# Patient Record
Sex: FEMALE | Race: BLACK OR AFRICAN AMERICAN | NOT HISPANIC OR LATINO | URBAN - METROPOLITAN AREA
[De-identification: names, ages, dates, MRNs, and addresses within clinical notes are randomized per-mention and may not be internally consistent; named-entity substitution may affect disease eponyms.]

---

## 2022-05-26 ENCOUNTER — OFFICE VISIT (OUTPATIENT)
Dept: URBAN - METROPOLITAN AREA CLINIC 70 | Facility: CLINIC | Age: 52
End: 2022-05-26
Payer: MEDICAID

## 2022-05-26 ENCOUNTER — WEB ENCOUNTER (OUTPATIENT)
Dept: URBAN - METROPOLITAN AREA CLINIC 70 | Facility: CLINIC | Age: 52
End: 2022-05-26

## 2022-05-26 VITALS
DIASTOLIC BLOOD PRESSURE: 75 MMHG | HEART RATE: 61 BPM | BODY MASS INDEX: 28.02 KG/M2 | SYSTOLIC BLOOD PRESSURE: 119 MMHG | TEMPERATURE: 98.1 F | WEIGHT: 139 LBS | HEIGHT: 59 IN

## 2022-05-26 DIAGNOSIS — K59.04 CHRONIC IDIOPATHIC CONSTIPATION: ICD-10-CM

## 2022-05-26 DIAGNOSIS — K21.9 GASTROESOPHAGEAL REFLUX DISEASE, UNSPECIFIED WHETHER ESOPHAGITIS PRESENT: ICD-10-CM

## 2022-05-26 PROCEDURE — 99204 OFFICE O/P NEW MOD 45 MIN: CPT

## 2022-05-26 RX ORDER — MELOXICAM 7.5 MG/1
TABLET ORAL
Qty: 0 | Refills: 0 | Status: ACTIVE | COMMUNITY
Start: 1900-01-01

## 2022-05-26 RX ORDER — VENLAFAXINE HYDROCHLORIDE 75 MG/1
TABLET ORAL
Qty: 0 | Refills: 0 | Status: ACTIVE | COMMUNITY
Start: 1900-01-01

## 2022-05-26 RX ORDER — PANTOPRAZOLE SODIUM 40 MG/1
TAKE 1 TABLET IN THE MORNING ON AN EMPTY STOMACH, WAIT 30 MINUTES, THEN START EATING TABLET, DELAYED RELEASE ORAL ONCE A DAY
Qty: 90 | Refills: 3 | OUTPATIENT
Start: 2022-05-26

## 2022-05-26 RX ORDER — BISOPROLOL FUMARATE AND HYDROCHLOROTHIAZIDE 5; 6.25 MG/1; MG/1
TAKE 1 TABLET BY ORAL ROUTE ONCE DAILY TABLET ORAL 1
Qty: 0 | Refills: 0 | Status: ACTIVE | COMMUNITY
Start: 1900-01-01

## 2022-05-26 RX ORDER — ATORVASTATIN CALCIUM 20 MG/1
TABLET, FILM COATED ORAL
Qty: 0 | Refills: 0 | Status: ACTIVE | COMMUNITY
Start: 1900-01-01

## 2022-05-26 RX ORDER — BENZONATATE 100 MG/1
TAKE 1 CAPSULE BY MOUTH THREE TIMES DAILY AS NEEDED DIAGNOSIS UNAVAILABLE CAPSULE ORAL
Qty: 90 | Refills: 0 | Status: ACTIVE | COMMUNITY

## 2022-05-26 RX ORDER — OXYBUTYNIN CHLORIDE 5 MG/1
TABLET ORAL
Qty: 0 | Refills: 0 | Status: ACTIVE | COMMUNITY
Start: 1900-01-01

## 2022-05-26 RX ORDER — FENOFIBRATE 54 MG/1
TAKE 1 TABLET BY MOUTH EVERY DAY WITH FOOD DIAGNOSIS UNAVAILABLE TABLET ORAL
Qty: 30 | Refills: 0 | Status: ACTIVE | COMMUNITY

## 2022-05-26 NOTE — HPI-TODAY'S VISIT:
Pt presents for GERD and constipation.  She states that she has experienced indigestion, assoicated regurgitation, and occasional nausea "all her life." She has never tried anything for her symptoms.  She is s/o cholecystectomy in 2018.  She denies melena, dysphagia, or abdominal pain.  She admits to constipation with bowel movements occuring every 2-3 days without feelings of complete evacuation.  She states that stools are small and hard. She has not tried anything for her symptoms. She denies rectal bleeding or diarrhea.   Last colonoscopy was in 2019 and was normal.  Recall 10 years.

## 2022-07-07 ENCOUNTER — OFFICE VISIT (OUTPATIENT)
Dept: URBAN - METROPOLITAN AREA CLINIC 70 | Facility: CLINIC | Age: 52
End: 2022-07-07

## 2022-07-07 NOTE — HPI-OTHER HISTORIES
OV 5/26/2022: Pt presents for GERD and constipation.  She states that she has experienced indigestion, assoicated regurgitation, and occasional nausea "all her life." She has never tried anything for her symptoms.  She is s/o cholecystectomy in 2018.  She denies melena, dysphagia, or abdominal pain.  She admits to constipation with bowel movements occuring every 2-3 days without feelings of complete evacuation.  She states that stools are small and hard. She has not tried anything for her symptoms. She denies rectal bleeding or diarrhea.   Last colonoscopy was in 2019 and was normal.  Recall 10 years.

## 2022-08-09 ENCOUNTER — OFFICE VISIT (OUTPATIENT)
Dept: URBAN - METROPOLITAN AREA CLINIC 70 | Facility: CLINIC | Age: 52
End: 2022-08-09
Payer: MEDICAID

## 2022-08-09 VITALS
HEART RATE: 63 BPM | DIASTOLIC BLOOD PRESSURE: 73 MMHG | SYSTOLIC BLOOD PRESSURE: 117 MMHG | BODY MASS INDEX: 27.01 KG/M2 | TEMPERATURE: 97.5 F | HEIGHT: 59 IN | WEIGHT: 134 LBS

## 2022-08-09 DIAGNOSIS — K21.9 GASTROESOPHAGEAL REFLUX DISEASE, UNSPECIFIED WHETHER ESOPHAGITIS PRESENT: ICD-10-CM

## 2022-08-09 DIAGNOSIS — K59.04 CHRONIC IDIOPATHIC CONSTIPATION: ICD-10-CM

## 2022-08-09 PROBLEM — 82934008: Status: ACTIVE | Noted: 2022-05-26

## 2022-08-09 PROCEDURE — 99214 OFFICE O/P EST MOD 30 MIN: CPT

## 2022-08-09 RX ORDER — BENZONATATE 100 MG/1
TAKE 1 CAPSULE BY MOUTH THREE TIMES DAILY AS NEEDED DIAGNOSIS UNAVAILABLE CAPSULE ORAL
Qty: 90 | Refills: 0 | Status: ACTIVE | COMMUNITY

## 2022-08-09 RX ORDER — FENOFIBRATE 54 MG/1
TAKE 1 TABLET BY MOUTH EVERY DAY WITH FOOD DIAGNOSIS UNAVAILABLE TABLET ORAL
Qty: 30 | Refills: 0 | Status: ACTIVE | COMMUNITY

## 2022-08-09 RX ORDER — BISOPROLOL FUMARATE AND HYDROCHLOROTHIAZIDE 5; 6.25 MG/1; MG/1
TAKE 1 TABLET BY ORAL ROUTE ONCE DAILY TABLET ORAL 1
Qty: 0 | Refills: 0 | Status: ACTIVE | COMMUNITY
Start: 1900-01-01

## 2022-08-09 RX ORDER — OXYBUTYNIN CHLORIDE 5 MG/1
TABLET ORAL
Qty: 0 | Refills: 0 | Status: ACTIVE | COMMUNITY
Start: 1900-01-01

## 2022-08-09 RX ORDER — ATORVASTATIN CALCIUM 20 MG/1
TABLET, FILM COATED ORAL
Qty: 0 | Refills: 0 | Status: ACTIVE | COMMUNITY
Start: 1900-01-01

## 2022-08-09 RX ORDER — PANTOPRAZOLE SODIUM 40 MG/1
TAKE 1 TABLET IN THE MORNING ON AN EMPTY STOMACH, WAIT 30 MINUTES, THEN START EATING TABLET, DELAYED RELEASE ORAL ONCE A DAY
Qty: 90 | Refills: 3 | Status: ACTIVE | COMMUNITY
Start: 2022-05-26

## 2022-08-09 RX ORDER — PANTOPRAZOLE SODIUM 40 MG/1
TAKE 1 TABLET IN THE MORNING ON AN EMPTY STOMACH, WAIT 30 MINUTES, THEN START EATING TABLET, DELAYED RELEASE ORAL ONCE A DAY
OUTPATIENT
Start: 2022-05-26

## 2022-08-09 RX ORDER — VENLAFAXINE HYDROCHLORIDE 75 MG/1
TABLET ORAL
Qty: 0 | Refills: 0 | Status: ACTIVE | COMMUNITY
Start: 1900-01-01

## 2022-08-09 RX ORDER — MELOXICAM 7.5 MG/1
TABLET ORAL
Qty: 0 | Refills: 0 | Status: ACTIVE | COMMUNITY
Start: 1900-01-01

## 2022-08-09 NOTE — HPI-TODAY'S VISIT:
Pt presents for a f/u for GERD and constipation. Today she states regurgitation and indigestion has improved with Pantoprazole 40mg.  She denies dysphagia, nausea, vomiting, or melena.  She admits to continued constipation.  She has been taking Miralax daily, but states it takes 3-4 days to have a bowel movements. She denies rectal bleeding, weight loss, or diarrhea.

## 2022-09-20 ENCOUNTER — OFFICE VISIT (OUTPATIENT)
Dept: URBAN - METROPOLITAN AREA CLINIC 70 | Facility: CLINIC | Age: 52
End: 2022-09-20
Payer: MEDICAID

## 2022-09-20 VITALS
WEIGHT: 136.84 LBS | HEIGHT: 59 IN | SYSTOLIC BLOOD PRESSURE: 110 MMHG | HEART RATE: 62 BPM | BODY MASS INDEX: 27.59 KG/M2 | DIASTOLIC BLOOD PRESSURE: 70 MMHG | TEMPERATURE: 97.2 F

## 2022-09-20 DIAGNOSIS — K58.1 IRRITABLE BOWEL SYNDROME WITH CONSTIPATION: ICD-10-CM

## 2022-09-20 DIAGNOSIS — K21.9 GASTROESOPHAGEAL REFLUX DISEASE, UNSPECIFIED WHETHER ESOPHAGITIS PRESENT: ICD-10-CM

## 2022-09-20 PROCEDURE — 99214 OFFICE O/P EST MOD 30 MIN: CPT

## 2022-09-20 RX ORDER — PANTOPRAZOLE SODIUM 40 MG/1
TAKE 1 TABLET IN THE MORNING ON AN EMPTY STOMACH, WAIT 30 MINUTES, THEN START EATING TABLET, DELAYED RELEASE ORAL ONCE A DAY
Status: ON HOLD | COMMUNITY
Start: 2022-05-26

## 2022-09-20 RX ORDER — VENLAFAXINE HYDROCHLORIDE 75 MG/1
TABLET ORAL
Qty: 0 | Refills: 0 | Status: ACTIVE | COMMUNITY
Start: 1900-01-01

## 2022-09-20 RX ORDER — OXYBUTYNIN CHLORIDE 5 MG/1
TABLET ORAL
Qty: 0 | Refills: 0 | Status: ACTIVE | COMMUNITY
Start: 1900-01-01

## 2022-09-20 RX ORDER — PANTOPRAZOLE SODIUM 40 MG/1
TAKE 1 TABLET IN THE MORNING ON AN EMPTY STOMACH, WAIT 30 MINUTES, THEN START EATING TABLET, DELAYED RELEASE ORAL ONCE A DAY
Qty: 90 | Refills: 3
Start: 2022-05-26

## 2022-09-20 RX ORDER — MELOXICAM 7.5 MG/1
TABLET ORAL
Qty: 0 | Refills: 0 | Status: ACTIVE | COMMUNITY
Start: 1900-01-01

## 2022-09-20 RX ORDER — BISOPROLOL FUMARATE AND HYDROCHLOROTHIAZIDE 5; 6.25 MG/1; MG/1
TAKE 1 TABLET BY ORAL ROUTE ONCE DAILY TABLET ORAL 1
Qty: 0 | Refills: 0 | Status: ACTIVE | COMMUNITY
Start: 1900-01-01

## 2022-09-20 RX ORDER — BENZONATATE 100 MG/1
TAKE 1 CAPSULE BY MOUTH THREE TIMES DAILY AS NEEDED DIAGNOSIS UNAVAILABLE CAPSULE ORAL
Qty: 90 | Refills: 0 | Status: ACTIVE | COMMUNITY

## 2022-09-20 RX ORDER — ATORVASTATIN CALCIUM 20 MG/1
TABLET, FILM COATED ORAL
Qty: 0 | Refills: 0 | Status: ACTIVE | COMMUNITY
Start: 1900-01-01

## 2022-09-20 RX ORDER — FENOFIBRATE 54 MG/1
TAKE 1 TABLET BY MOUTH EVERY DAY WITH FOOD DIAGNOSIS UNAVAILABLE TABLET ORAL
Qty: 30 | Refills: 0 | Status: ACTIVE | COMMUNITY

## 2022-09-20 RX ORDER — LINACLOTIDE 290 UG/1
1 CAPSULE AT LEAST 30 MINUTES BEFORE THE FIRST MEAL OF THE DAY ON AN EMPTY STOMACH CAPSULE, GELATIN COATED ORAL ONCE A DAY
Qty: 90 | Refills: 3 | OUTPATIENT
Start: 2022-09-20 | End: 2023-09-15

## 2022-09-20 NOTE — HPI-TODAY'S VISIT:
The pt presents for a f/u for GERD and constipation.  She states that she ran out of her rx for Pantoprazole 40mg a month ago. Since then GERD symptoms have returned.  She states that medication use previously controlling symptoms well.  She states samples for Linzess 145mcg provided at her LOV mildly improved constipation.  She denies weight loss, nausea, vomiting, rectal bleeding, or melena.

## 2022-09-20 NOTE — HPI-OTHER HISTORIES
OV 8/9/2022: Pt presents for a f/u for GERD and constipation. Today she states regurgitation and indigestion has improved with Pantoprazole 40mg.  She denies dysphagia, nausea, vomiting, or melena.  She admits to continued constipation.  She has been taking Miralax daily, but states it takes 3-4 days to have a bowel movements. She denies rectal bleeding, weight loss, or diarrhea. ------------------------ OV 5/26/2022: Pt presents for GERD and constipation.  She states that she has experienced indigestion, assoicated regurgitation, and occasional nausea "all her life." She has never tried anything for her symptoms.  She is s/o cholecystectomy in 2018.  She denies melena, dysphagia, or abdominal pain.  She admits to constipation with bowel movements occuring every 2-3 days without feelings of complete evacuation.  She states that stools are small and hard. She has not tried anything for her symptoms. She denies rectal bleeding or diarrhea.   Last colonoscopy was in 2019 and was normal.  Recall 10 years.

## 2022-11-15 ENCOUNTER — OFFICE VISIT (OUTPATIENT)
Dept: URBAN - METROPOLITAN AREA CLINIC 70 | Facility: CLINIC | Age: 52
End: 2022-11-15
Payer: MEDICAID

## 2022-11-15 VITALS
BODY MASS INDEX: 27.58 KG/M2 | TEMPERATURE: 98.2 F | SYSTOLIC BLOOD PRESSURE: 113 MMHG | HEIGHT: 59 IN | HEART RATE: 67 BPM | DIASTOLIC BLOOD PRESSURE: 75 MMHG | WEIGHT: 136.8 LBS

## 2022-11-15 DIAGNOSIS — K21.9 GASTROESOPHAGEAL REFLUX DISEASE, UNSPECIFIED WHETHER ESOPHAGITIS PRESENT: ICD-10-CM

## 2022-11-15 DIAGNOSIS — K58.1 IRRITABLE BOWEL SYNDROME WITH CONSTIPATION: ICD-10-CM

## 2022-11-15 PROCEDURE — 99214 OFFICE O/P EST MOD 30 MIN: CPT

## 2022-11-15 RX ORDER — POLYETHYLENE GLYCOL 3350, SODIUM SULFATE ANHYDROUS, SODIUM BICARBONATE, SODIUM CHLORIDE, POTASSIUM CHLORIDE 236; 22.74; 6.74; 5.86; 2.97 G/4L; G/4L; G/4L; G/4L; G/4L
AS DIRECTED POWDER, FOR SOLUTION ORAL ONCE A DAY
Qty: 236 GM | Refills: 0 | OUTPATIENT
Start: 2022-11-15 | End: 2022-11-16

## 2022-11-15 RX ORDER — MELOXICAM 7.5 MG/1
TABLET ORAL
Qty: 0 | Refills: 0 | Status: ACTIVE | COMMUNITY
Start: 1900-01-01

## 2022-11-15 RX ORDER — LINACLOTIDE 290 UG/1
1 CAPSULE AT LEAST 30 MINUTES BEFORE THE FIRST MEAL OF THE DAY ON AN EMPTY STOMACH CAPSULE, GELATIN COATED ORAL ONCE A DAY
Qty: 90 | Refills: 3
Start: 2022-09-20 | End: 2023-11-10

## 2022-11-15 RX ORDER — PANTOPRAZOLE SODIUM 40 MG/1
TAKE 1 TABLET IN THE MORNING ON AN EMPTY STOMACH, WAIT 30 MINUTES, THEN START EATING TABLET, DELAYED RELEASE ORAL ONCE A DAY
OUTPATIENT
Start: 2022-05-26

## 2022-11-15 RX ORDER — ATORVASTATIN CALCIUM 20 MG/1
TABLET, FILM COATED ORAL
Qty: 0 | Refills: 0 | Status: ACTIVE | COMMUNITY
Start: 1900-01-01

## 2022-11-15 RX ORDER — OXYBUTYNIN CHLORIDE 5 MG/1
TABLET ORAL
Qty: 0 | Refills: 0 | Status: ACTIVE | COMMUNITY
Start: 1900-01-01

## 2022-11-15 RX ORDER — PANTOPRAZOLE SODIUM 40 MG/1
TAKE 1 TABLET IN THE MORNING ON AN EMPTY STOMACH, WAIT 30 MINUTES, THEN START EATING TABLET, DELAYED RELEASE ORAL ONCE A DAY
Qty: 90 | Refills: 3 | Status: ACTIVE | COMMUNITY
Start: 2022-05-26

## 2022-11-15 RX ORDER — FENOFIBRATE 54 MG/1
TAKE 1 TABLET BY MOUTH EVERY DAY WITH FOOD DIAGNOSIS UNAVAILABLE TABLET ORAL
Qty: 30 | Refills: 0 | Status: ACTIVE | COMMUNITY

## 2022-11-15 RX ORDER — LINACLOTIDE 290 UG/1
1 CAPSULE AT LEAST 30 MINUTES BEFORE THE FIRST MEAL OF THE DAY ON AN EMPTY STOMACH CAPSULE, GELATIN COATED ORAL ONCE A DAY
Qty: 90 | Refills: 3 | Status: ACTIVE | COMMUNITY
Start: 2022-09-20 | End: 2023-09-15

## 2022-11-15 RX ORDER — BENZONATATE 100 MG/1
TAKE 1 CAPSULE BY MOUTH THREE TIMES DAILY AS NEEDED DIAGNOSIS UNAVAILABLE CAPSULE ORAL
Qty: 90 | Refills: 0 | Status: ACTIVE | COMMUNITY

## 2022-11-15 RX ORDER — VENLAFAXINE HYDROCHLORIDE 75 MG/1
TABLET ORAL
Qty: 0 | Refills: 0 | Status: ACTIVE | COMMUNITY
Start: 1900-01-01

## 2022-11-15 RX ORDER — BISOPROLOL FUMARATE AND HYDROCHLOROTHIAZIDE 5; 6.25 MG/1; MG/1
TAKE 1 TABLET BY ORAL ROUTE ONCE DAILY TABLET ORAL 1
Qty: 0 | Refills: 0 | Status: ACTIVE | COMMUNITY
Start: 1900-01-01

## 2022-11-15 NOTE — HPI-OTHER HISTORIES
OV 9/20/2022: The pt presents for a f/u for GERD and constipation.  She states that she ran out of her rx for Pantoprazole 40mg a month ago. Since then GERD symptoms have returned.  She states that medication use previously controlling symptoms well.  She states samples for Linzess 145mcg provided at her LOV mildly improved constipation.  She denies weight loss, nausea, vomiting, rectal bleeding, or melena. -------------------------- OV 8/9/2022: Pt presents for a f/u for GERD and constipation. Today she states regurgitation and indigestion has improved with Pantoprazole 40mg.  She denies dysphagia, nausea, vomiting, or melena.  She admits to continued constipation.  She has been taking Miralax daily, but states it takes 3-4 days to have a bowel movements. She denies rectal bleeding, weight loss, or diarrhea. ------------------------ OV 5/26/2022: Pt presents for GERD and constipation.  She states that she has experienced indigestion, assoicated regurgitation, and occasional nausea "all her life." She has never tried anything for her symptoms.  She is s/o cholecystectomy in 2018.  She denies melena, dysphagia, or abdominal pain.  She admits to constipation with bowel movements occuring every 2-3 days without feelings of complete evacuation.  She states that stools are small and hard. She has not tried anything for her symptoms. She denies rectal bleeding or diarrhea.   Last colonoscopy was in 2019 and was normal.  Recall 10 years.

## 2022-11-15 NOTE — HPI-TODAY'S VISIT:
The pt presents for a f/u for GERD and constipation.  She states that GERD symptoms are well controlled with Pantoprazole 40mg.  She states that following her LOV, she did not have proper insurance information to provide to [facility] pharmacy when they called regarding her rx fo Linzess 290mcg.  Therefore she states she never started Linzess.  She is requesting the rx be resent to the pharmacy.  No other GI symptoms.

## 2022-11-22 ENCOUNTER — OFFICE VISIT (OUTPATIENT)
Dept: URBAN - METROPOLITAN AREA CLINIC 70 | Facility: CLINIC | Age: 52
End: 2022-11-22

## 2022-11-29 ENCOUNTER — TELEPHONE ENCOUNTER (OUTPATIENT)
Dept: URBAN - METROPOLITAN AREA CLINIC 70 | Facility: CLINIC | Age: 52
End: 2022-11-29

## 2022-12-08 ENCOUNTER — P2P PATIENT RECORD (OUTPATIENT)
Age: 52
End: 2022-12-08

## 2022-12-09 ENCOUNTER — OFFICE VISIT (OUTPATIENT)
Dept: URBAN - METROPOLITAN AREA CLINIC 70 | Facility: CLINIC | Age: 52
End: 2022-12-09
Payer: MEDICAID

## 2022-12-09 ENCOUNTER — LAB OUTSIDE AN ENCOUNTER (OUTPATIENT)
Dept: URBAN - METROPOLITAN AREA CLINIC 70 | Facility: CLINIC | Age: 52
End: 2022-12-09

## 2022-12-09 VITALS
HEART RATE: 73 BPM | SYSTOLIC BLOOD PRESSURE: 121 MMHG | WEIGHT: 135.3 LBS | BODY MASS INDEX: 27.28 KG/M2 | HEIGHT: 59 IN | DIASTOLIC BLOOD PRESSURE: 79 MMHG | TEMPERATURE: 97.6 F

## 2022-12-09 DIAGNOSIS — K21.9 GASTROESOPHAGEAL REFLUX DISEASE, UNSPECIFIED WHETHER ESOPHAGITIS PRESENT: ICD-10-CM

## 2022-12-09 DIAGNOSIS — R11.2 NAUSEA AND VOMITING, UNSPECIFIED VOMITING TYPE: ICD-10-CM

## 2022-12-09 DIAGNOSIS — K58.1 IRRITABLE BOWEL SYNDROME WITH CONSTIPATION: ICD-10-CM

## 2022-12-09 PROCEDURE — 99214 OFFICE O/P EST MOD 30 MIN: CPT

## 2022-12-09 RX ORDER — PANTOPRAZOLE SODIUM 40 MG/1
TAKE 1 TABLET IN THE MORNING ON AN EMPTY STOMACH, WAIT 30 MINUTES, THEN START EATING TABLET, DELAYED RELEASE ORAL ONCE A DAY
OUTPATIENT
Start: 2022-05-26

## 2022-12-09 RX ORDER — OXYBUTYNIN CHLORIDE 5 MG/1
TABLET ORAL
Qty: 0 | Refills: 0 | Status: ACTIVE | COMMUNITY
Start: 1900-01-01

## 2022-12-09 RX ORDER — BENZONATATE 100 MG/1
TAKE 1 CAPSULE BY MOUTH THREE TIMES DAILY AS NEEDED DIAGNOSIS UNAVAILABLE CAPSULE ORAL
Qty: 90 | Refills: 0 | Status: ACTIVE | COMMUNITY

## 2022-12-09 RX ORDER — MELOXICAM 7.5 MG/1
TABLET ORAL
Qty: 0 | Refills: 0 | Status: ACTIVE | COMMUNITY
Start: 1900-01-01

## 2022-12-09 RX ORDER — ATORVASTATIN CALCIUM 20 MG/1
TABLET, FILM COATED ORAL
Qty: 0 | Refills: 0 | Status: ACTIVE | COMMUNITY
Start: 1900-01-01

## 2022-12-09 RX ORDER — LINACLOTIDE 290 UG/1
1 CAPSULE AT LEAST 30 MINUTES BEFORE THE FIRST MEAL OF THE DAY ON AN EMPTY STOMACH CAPSULE, GELATIN COATED ORAL ONCE A DAY
OUTPATIENT
Start: 2022-09-20

## 2022-12-09 RX ORDER — FENOFIBRATE 54 MG/1
TAKE 1 TABLET BY MOUTH EVERY DAY WITH FOOD DIAGNOSIS UNAVAILABLE TABLET ORAL
Qty: 30 | Refills: 0 | Status: ACTIVE | COMMUNITY

## 2022-12-09 RX ORDER — PANTOPRAZOLE SODIUM 40 MG/1
TAKE 1 TABLET IN THE MORNING ON AN EMPTY STOMACH, WAIT 30 MINUTES, THEN START EATING TABLET, DELAYED RELEASE ORAL ONCE A DAY
Status: ACTIVE | COMMUNITY
Start: 2022-05-26

## 2022-12-09 RX ORDER — BISOPROLOL FUMARATE AND HYDROCHLOROTHIAZIDE 5; 6.25 MG/1; MG/1
TAKE 1 TABLET BY ORAL ROUTE ONCE DAILY TABLET ORAL 1
Qty: 0 | Refills: 0 | Status: ACTIVE | COMMUNITY
Start: 1900-01-01

## 2022-12-09 RX ORDER — VENLAFAXINE HYDROCHLORIDE 75 MG/1
TABLET ORAL
Qty: 0 | Refills: 0 | Status: ACTIVE | COMMUNITY
Start: 1900-01-01

## 2022-12-09 RX ORDER — LINACLOTIDE 290 UG/1
1 CAPSULE AT LEAST 30 MINUTES BEFORE THE FIRST MEAL OF THE DAY ON AN EMPTY STOMACH CAPSULE, GELATIN COATED ORAL ONCE A DAY
Qty: 90 | Refills: 3 | Status: ACTIVE | COMMUNITY
Start: 2022-09-20 | End: 2023-11-10

## 2022-12-09 NOTE — HPI-OTHER HISTORIES
OV 11/15/2022: The pt presents for a f/u for GERD and constipation.  She states that GERD symptoms are well controlled with Pantoprazole 40mg.  She states that following her LOV, she did not have proper insurance information to provide to [facility] pharmacy when they called regarding her rx fo Linzess 290mcg.  Therefore she states she never started Linzess.  She is requesting the rx be resent to the pharmacy.  No other GI symptoms. ------------------------------------------------- OV 9/20/2022: The pt presents for a f/u for GERD and constipation.  She states that she ran out of her rx for Pantoprazole 40mg a month ago. Since then GERD symptoms have returned.  She states that medication use previously controlling symptoms well.  She states samples for Linzess 145mcg provided at her LOV mildly improved constipation.  She denies weight loss, nausea, vomiting, rectal bleeding, or melena. -------------------------- OV 8/9/2022: Pt presents for a f/u for GERD and constipation. Today she states regurgitation and indigestion has improved with Pantoprazole 40mg.  She denies dysphagia, nausea, vomiting, or melena.  She admits to continued constipation.  She has been taking Miralax daily, but states it takes 3-4 days to have a bowel movements. She denies rectal bleeding, weight loss, or diarrhea. ------------------------ OV 5/26/2022: Pt presents for GERD and constipation.  She states that she has experienced indigestion, assoicated regurgitation, and occasional nausea "all her life." She has never tried anything for her symptoms.  She is s/o cholecystectomy in 2018.  She denies melena, dysphagia, or abdominal pain.  She admits to constipation with bowel movements occuring every 2-3 days without feelings of complete evacuation.  She states that stools are small and hard. She has not tried anything for her symptoms. She denies rectal bleeding or diarrhea.   Last colonoscopy was in 2019 and was normal.  Recall 10 years.

## 2022-12-09 NOTE — HPI-TODAY'S VISIT:
The pt was seen in the ED 11/26/2022 for an episode of vomiting. Today she states vomiting has improved since her ED visit, but nausea is still occuring daily. Symptoms were previously well controlled with Pantoprazole 40mg daily at her LOV.  No prior EGD. S/P CCY. Linzess 290mcg was just approved by her insurance today (the pt received a call during her office visit).  She is requesting more samples in the meantime.  She states taking Linzess 290mcg manages her constipation well.

## 2022-12-12 PROBLEM — 235595009: Status: ACTIVE | Noted: 2022-05-26

## 2022-12-30 ENCOUNTER — TELEPHONE ENCOUNTER (OUTPATIENT)
Dept: URBAN - METROPOLITAN AREA CLINIC 92 | Facility: CLINIC | Age: 52
End: 2022-12-30

## 2022-12-30 ENCOUNTER — CLAIMS CREATED FROM THE CLAIM WINDOW (OUTPATIENT)
Dept: URBAN - METROPOLITAN AREA SURGERY CENTER 24 | Facility: SURGERY CENTER | Age: 52
End: 2022-12-30

## 2022-12-30 ENCOUNTER — CLAIMS CREATED FROM THE CLAIM WINDOW (OUTPATIENT)
Dept: URBAN - METROPOLITAN AREA SURGERY CENTER 24 | Facility: SURGERY CENTER | Age: 52
End: 2022-12-30
Payer: MEDICAID

## 2022-12-30 ENCOUNTER — CLAIMS CREATED FROM THE CLAIM WINDOW (OUTPATIENT)
Dept: URBAN - METROPOLITAN AREA CLINIC 4 | Facility: CLINIC | Age: 52
End: 2022-12-30
Payer: MEDICAID

## 2022-12-30 DIAGNOSIS — K20.90 ESOPHAGITIS: ICD-10-CM

## 2022-12-30 DIAGNOSIS — K31.89 OTHER DISEASES OF STOMACH AND DUODENUM: ICD-10-CM

## 2022-12-30 DIAGNOSIS — K21.9 ACID REFLUX: ICD-10-CM

## 2022-12-30 DIAGNOSIS — R11.2 ACUTE NAUSEA WITH NONBILIOUS VOMITING: ICD-10-CM

## 2022-12-30 PROCEDURE — 88305 TISSUE EXAM BY PATHOLOGIST: CPT | Performed by: PATHOLOGY

## 2022-12-30 PROCEDURE — G8907 PT DOC NO EVENTS ON DISCHARG: HCPCS | Performed by: INTERNAL MEDICINE

## 2022-12-30 PROCEDURE — 43239 EGD BIOPSY SINGLE/MULTIPLE: CPT | Performed by: INTERNAL MEDICINE

## 2022-12-30 RX ORDER — PANTOPRAZOLE SODIUM 40 MG/1
TAKE 1 TABLET IN THE MORNING ON AN EMPTY STOMACH, WAIT 30 MINUTES, THEN START EATING TABLET, DELAYED RELEASE ORAL ONCE A DAY
Status: ACTIVE | COMMUNITY
Start: 2022-05-26

## 2022-12-30 RX ORDER — PANTOPRAZOLE SODIUM 40 MG/1
TAKE 1 TABLET IN THE MORNING ON AN EMPTY STOMACH, WAIT 30 MINUTES, THEN START EATING TABLET, DELAYED RELEASE ORAL ONCE A DAY
Qty: 90 | Refills: 0

## 2022-12-30 RX ORDER — BENZONATATE 100 MG/1
TAKE 1 CAPSULE BY MOUTH THREE TIMES DAILY AS NEEDED DIAGNOSIS UNAVAILABLE CAPSULE ORAL
Qty: 90 | Refills: 0 | Status: ACTIVE | COMMUNITY

## 2022-12-30 RX ORDER — FENOFIBRATE 54 MG/1
TAKE 1 TABLET BY MOUTH EVERY DAY WITH FOOD DIAGNOSIS UNAVAILABLE TABLET ORAL
Qty: 30 | Refills: 0 | Status: ACTIVE | COMMUNITY

## 2022-12-30 RX ORDER — MELOXICAM 7.5 MG/1
TABLET ORAL
Qty: 0 | Refills: 0 | Status: ACTIVE | COMMUNITY
Start: 1900-01-01

## 2022-12-30 RX ORDER — ATORVASTATIN CALCIUM 20 MG/1
TABLET, FILM COATED ORAL
Qty: 0 | Refills: 0 | Status: ACTIVE | COMMUNITY
Start: 1900-01-01

## 2022-12-30 RX ORDER — OXYBUTYNIN CHLORIDE 5 MG/1
TABLET ORAL
Qty: 0 | Refills: 0 | Status: ACTIVE | COMMUNITY
Start: 1900-01-01

## 2022-12-30 RX ORDER — BISOPROLOL FUMARATE AND HYDROCHLOROTHIAZIDE 5; 6.25 MG/1; MG/1
TAKE 1 TABLET BY ORAL ROUTE ONCE DAILY TABLET ORAL 1
Qty: 0 | Refills: 0 | Status: ACTIVE | COMMUNITY
Start: 1900-01-01

## 2022-12-30 RX ORDER — LINACLOTIDE 290 UG/1
1 CAPSULE AT LEAST 30 MINUTES BEFORE THE FIRST MEAL OF THE DAY ON AN EMPTY STOMACH CAPSULE, GELATIN COATED ORAL ONCE A DAY
Status: ACTIVE | COMMUNITY
Start: 2022-09-20

## 2022-12-30 RX ORDER — VENLAFAXINE HYDROCHLORIDE 75 MG/1
TABLET ORAL
Qty: 0 | Refills: 0 | Status: ACTIVE | COMMUNITY
Start: 1900-01-01

## 2023-02-15 ENCOUNTER — DASHBOARD ENCOUNTERS (OUTPATIENT)
Age: 53
End: 2023-02-15

## 2023-02-15 ENCOUNTER — OFFICE VISIT (OUTPATIENT)
Dept: URBAN - METROPOLITAN AREA CLINIC 70 | Facility: CLINIC | Age: 53
End: 2023-02-15
Payer: MEDICAID

## 2023-02-15 VITALS
TEMPERATURE: 96.8 F | SYSTOLIC BLOOD PRESSURE: 131 MMHG | WEIGHT: 138 LBS | DIASTOLIC BLOOD PRESSURE: 81 MMHG | HEIGHT: 59 IN | HEART RATE: 65 BPM | BODY MASS INDEX: 27.82 KG/M2

## 2023-02-15 DIAGNOSIS — K21.00 GASTROESOPHAGEAL REFLUX DISEASE WITH ESOPHAGITIS WITHOUT HEMORRHAGE: ICD-10-CM

## 2023-02-15 DIAGNOSIS — K58.1 IRRITABLE BOWEL SYNDROME WITH CONSTIPATION: ICD-10-CM

## 2023-02-15 DIAGNOSIS — R11.0 NAUSEA: ICD-10-CM

## 2023-02-15 PROBLEM — 266433003: Status: ACTIVE | Noted: 2023-02-15

## 2023-02-15 PROBLEM — 440630006: Status: ACTIVE | Noted: 2022-09-20

## 2023-02-15 PROCEDURE — 99214 OFFICE O/P EST MOD 30 MIN: CPT

## 2023-02-15 RX ORDER — PANTOPRAZOLE SODIUM 40 MG/1
TAKE 1 TABLET IN THE MORNING ON AN EMPTY STOMACH, WAIT 30 MINUTES, THEN START EATING TABLET, DELAYED RELEASE ORAL ONCE A DAY
OUTPATIENT
Start: 2022-05-26

## 2023-02-15 RX ORDER — BISOPROLOL FUMARATE AND HYDROCHLOROTHIAZIDE 5; 6.25 MG/1; MG/1
TAKE 1 TABLET BY ORAL ROUTE ONCE DAILY TABLET ORAL 1
Qty: 0 | Refills: 0 | Status: ACTIVE | COMMUNITY
Start: 1900-01-01

## 2023-02-15 RX ORDER — FENOFIBRATE 54 MG/1
TAKE 1 TABLET BY MOUTH EVERY DAY WITH FOOD DIAGNOSIS UNAVAILABLE TABLET ORAL
Qty: 30 | Refills: 0 | Status: ACTIVE | COMMUNITY

## 2023-02-15 RX ORDER — VENLAFAXINE HYDROCHLORIDE 75 MG/1
TABLET ORAL
Qty: 0 | Refills: 0 | Status: ACTIVE | COMMUNITY
Start: 1900-01-01

## 2023-02-15 RX ORDER — ATORVASTATIN CALCIUM 20 MG/1
TABLET, FILM COATED ORAL
Qty: 0 | Refills: 0 | Status: ACTIVE | COMMUNITY
Start: 1900-01-01

## 2023-02-15 RX ORDER — LINACLOTIDE 290 UG/1
1 CAPSULE AT LEAST 30 MINUTES BEFORE THE FIRST MEAL OF THE DAY ON AN EMPTY STOMACH CAPSULE, GELATIN COATED ORAL ONCE A DAY
OUTPATIENT
Start: 2022-09-20

## 2023-02-15 RX ORDER — OXYBUTYNIN CHLORIDE 5 MG/1
TABLET ORAL
Qty: 0 | Refills: 0 | Status: ACTIVE | COMMUNITY
Start: 1900-01-01

## 2023-02-15 RX ORDER — LINACLOTIDE 290 UG/1
1 CAPSULE AT LEAST 30 MINUTES BEFORE THE FIRST MEAL OF THE DAY ON AN EMPTY STOMACH CAPSULE, GELATIN COATED ORAL ONCE A DAY
Status: ACTIVE | COMMUNITY
Start: 2022-09-20

## 2023-02-15 RX ORDER — BENZONATATE 100 MG/1
TAKE 1 CAPSULE BY MOUTH THREE TIMES DAILY AS NEEDED DIAGNOSIS UNAVAILABLE CAPSULE ORAL
Qty: 90 | Refills: 0 | Status: ACTIVE | COMMUNITY

## 2023-02-15 RX ORDER — PROMETHAZINE HYDROCHLORIDE 25 MG/1
1 SUPPOSITORY AS NEEDED SUPPOSITORY RECTAL
OUTPATIENT

## 2023-02-15 RX ORDER — PROMETHAZINE HYDROCHLORIDE 25 MG/1
1 SUPPOSITORY AS NEEDED SUPPOSITORY RECTAL
Status: ACTIVE | COMMUNITY

## 2023-02-15 RX ORDER — PANTOPRAZOLE SODIUM 40 MG/1
TAKE 1 TABLET IN THE MORNING ON AN EMPTY STOMACH, WAIT 30 MINUTES, THEN START EATING TABLET, DELAYED RELEASE ORAL ONCE A DAY
Status: ACTIVE | COMMUNITY
Start: 2022-05-26

## 2023-02-15 RX ORDER — MELOXICAM 7.5 MG/1
TABLET ORAL
Qty: 0 | Refills: 0 | Status: ON HOLD | COMMUNITY
Start: 1900-01-01

## 2023-02-15 NOTE — HPI-OTHER HISTORIES
OV 12/9/2022: The pt was seen in the ED 11/26/2022 for an episode of vomiting. Today she states vomiting has improved since her ED visit, but nausea is still occuring daily. Symptoms were previously well controlled with Pantoprazole 40mg daily at her LOV.  No prior EGD. S/P CCY. Linzess 290mcg was just approved by her insurance today (the pt received a call during her office visit).  She is requesting more samples in the meantime.  She states taking Linzess 290mcg manages her constipation well. --------------------------------------------------------- OV 11/15/2022: The pt presents for a f/u for GERD and constipation.  She states that GERD symptoms are well controlled with Pantoprazole 40mg.  She states that following her LOV, she did not have proper insurance information to provide to [facility] pharmacy when they called regarding her rx fo Linzess 290mcg.  Therefore she states she never started Linzess.  She is requesting the rx be resent to the pharmacy.  No other GI symptoms. ------------------------------------------------- OV 9/20/2022: The pt presents for a f/u for GERD and constipation.  She states that she ran out of her rx for Pantoprazole 40mg a month ago. Since then GERD symptoms have returned.  She states that medication use previously controlling symptoms well.  She states samples for Linzess 145mcg provided at her LOV mildly improved constipation.  She denies weight loss, nausea, vomiting, rectal bleeding, or melena. -------------------------- OV 8/9/2022: Pt presents for a f/u for GERD and constipation. Today she states regurgitation and indigestion has improved with Pantoprazole 40mg.  She denies dysphagia, nausea, vomiting, or melena.  She admits to continued constipation.  She has been taking Miralax daily, but states it takes 3-4 days to have a bowel movements. She denies rectal bleeding, weight loss, or diarrhea. ------------------------ OV 5/26/2022: Pt presents for GERD and constipation.  She states that she has experienced indigestion, assoicated regurgitation, and occasional nausea "all her life." She has never tried anything for her symptoms.  She is s/o cholecystectomy in 2018.  She denies melena, dysphagia, or abdominal pain.  She admits to constipation with bowel movements occuring every 2-3 days without feelings of complete evacuation.  She states that stools are small and hard. She has not tried anything for her symptoms. She denies rectal bleeding or diarrhea.   Last colonoscopy was in 2019 and was normal.  Recall 10 years.

## 2023-02-15 NOTE — HPI-TODAY'S VISIT:
The pt presents for a procedure f/u.  EGD 12/30/2022 showed LA grade A esophagitis and small hiatal hernia.  Biopsies were negative for H. pylori or dysplasia.  Today she states Linzess 290mcg is controlling constipation well. She states since her EGD her PCP added carafate and promethazine prn in addition to her current rx for Pantoprazole. She states the addition of these medications have improved GERD symptoms.  No other GI complaints.